# Patient Record
Sex: MALE | Race: WHITE | URBAN - METROPOLITAN AREA
[De-identification: names, ages, dates, MRNs, and addresses within clinical notes are randomized per-mention and may not be internally consistent; named-entity substitution may affect disease eponyms.]

---

## 2018-04-11 ENCOUNTER — APPOINTMENT (OUTPATIENT)
Dept: URBAN - METROPOLITAN AREA CLINIC 292 | Age: 58
Setting detail: DERMATOLOGY
End: 2018-04-11

## 2018-04-11 DIAGNOSIS — L40.0 PSORIASIS VULGARIS: ICD-10-CM

## 2018-04-11 DIAGNOSIS — Z79.899 OTHER LONG TERM (CURRENT) DRUG THERAPY: ICD-10-CM

## 2018-04-11 PROCEDURE — OTHER HIGH RISK MEDICATION MONITORING: OTHER

## 2018-04-11 PROCEDURE — OTHER TREATMENT REGIMEN: OTHER

## 2018-04-11 PROCEDURE — 99203 OFFICE O/P NEW LOW 30 MIN: CPT

## 2018-04-11 PROCEDURE — OTHER PPD: OTHER

## 2018-04-11 PROCEDURE — OTHER COUNSELING: OTHER

## 2018-04-11 PROCEDURE — OTHER ORDER TESTS: OTHER

## 2018-04-11 PROCEDURE — 86580 TB INTRADERMAL TEST: CPT

## 2018-04-11 ASSESSMENT — LOCATION ZONE DERM
LOCATION ZONE: TRUNK
LOCATION ZONE: ARM
LOCATION ZONE: LEG

## 2018-04-11 ASSESSMENT — LOCATION DETAILED DESCRIPTION DERM
LOCATION DETAILED: SUPERIOR LUMBAR SPINE
LOCATION DETAILED: RIGHT ANTERIOR PROXIMAL THIGH
LOCATION DETAILED: LEFT VENTRAL DISTAL FOREARM
LOCATION DETAILED: LEFT ANTERIOR PROXIMAL THIGH
LOCATION DETAILED: LEFT MID-UPPER BACK

## 2018-04-11 ASSESSMENT — LOCATION SIMPLE DESCRIPTION DERM
LOCATION SIMPLE: LEFT THIGH
LOCATION SIMPLE: RIGHT THIGH
LOCATION SIMPLE: LOWER BACK
LOCATION SIMPLE: LEFT FOREARM
LOCATION SIMPLE: LEFT UPPER BACK

## 2018-04-11 ASSESSMENT — BSA PSORIASIS: % BODY COVERED IN PSORIASIS: 60

## 2018-04-11 ASSESSMENT — PGA PSORIASIS: PGA PSORIASIS: MARKED (MARKED PLAQUE ELEVATION, BRIGHT ERYTHEMA, THICK NONTENACIOUS SCALE PREDOMINATES)

## 2018-04-11 NOTE — HPI: RASH
How Severe Is Your Rash?: moderate
Is This A New Presentation, Or A Follow-Up?: Rash
Additional History: Patient states that previously his PCP gave him several topical creams to use with no relief.  Never tried oral medications or injections

## 2018-04-13 ENCOUNTER — APPOINTMENT (OUTPATIENT)
Dept: URBAN - METROPOLITAN AREA CLINIC 292 | Age: 58
Setting detail: DERMATOLOGY
End: 2018-04-13

## 2018-04-13 DIAGNOSIS — L40.0 PSORIASIS VULGARIS: ICD-10-CM

## 2018-04-13 PROCEDURE — OTHER PPD RESULT: OTHER

## 2018-04-13 ASSESSMENT — LOCATION DETAILED DESCRIPTION DERM: LOCATION DETAILED: LEFT VENTRAL PROXIMAL FOREARM

## 2018-04-13 ASSESSMENT — LOCATION ZONE DERM: LOCATION ZONE: ARM

## 2018-04-13 ASSESSMENT — LOCATION SIMPLE DESCRIPTION DERM: LOCATION SIMPLE: LEFT FOREARM

## 2018-05-09 ENCOUNTER — APPOINTMENT (OUTPATIENT)
Dept: URBAN - METROPOLITAN AREA CLINIC 292 | Age: 58
Setting detail: DERMATOLOGY
End: 2018-05-09

## 2018-05-09 DIAGNOSIS — Z79.899 OTHER LONG TERM (CURRENT) DRUG THERAPY: ICD-10-CM

## 2018-05-09 DIAGNOSIS — L40.0 PSORIASIS VULGARIS: ICD-10-CM

## 2018-05-09 PROCEDURE — OTHER HIGH RISK MEDICATION MONITORING: OTHER

## 2018-05-09 PROCEDURE — 99214 OFFICE O/P EST MOD 30 MIN: CPT

## 2018-05-09 PROCEDURE — OTHER TREATMENT REGIMEN: OTHER

## 2018-05-09 PROCEDURE — OTHER COUNSELING: OTHER

## 2018-05-09 ASSESSMENT — LOCATION DETAILED DESCRIPTION DERM
LOCATION DETAILED: LEFT MID-UPPER BACK
LOCATION DETAILED: LEFT ANTERIOR PROXIMAL THIGH
LOCATION DETAILED: RIGHT ANTERIOR PROXIMAL THIGH
LOCATION DETAILED: SUPERIOR LUMBAR SPINE

## 2018-05-09 ASSESSMENT — LOCATION SIMPLE DESCRIPTION DERM
LOCATION SIMPLE: LEFT THIGH
LOCATION SIMPLE: RIGHT THIGH
LOCATION SIMPLE: LOWER BACK
LOCATION SIMPLE: LEFT UPPER BACK

## 2018-05-09 ASSESSMENT — LOCATION ZONE DERM
LOCATION ZONE: TRUNK
LOCATION ZONE: LEG

## 2018-06-20 ENCOUNTER — APPOINTMENT (OUTPATIENT)
Dept: URBAN - METROPOLITAN AREA CLINIC 292 | Age: 58
Setting detail: DERMATOLOGY
End: 2018-06-20

## 2019-04-17 ENCOUNTER — APPOINTMENT (OUTPATIENT)
Dept: URBAN - METROPOLITAN AREA CLINIC 292 | Age: 59
Setting detail: DERMATOLOGY
End: 2019-04-17

## 2019-04-17 DIAGNOSIS — Z79.899 OTHER LONG TERM (CURRENT) DRUG THERAPY: ICD-10-CM

## 2019-04-17 DIAGNOSIS — L40.0 PSORIASIS VULGARIS: ICD-10-CM

## 2019-04-17 PROCEDURE — OTHER ORDER TESTS: OTHER

## 2019-04-17 PROCEDURE — OTHER PRESCRIPTION: OTHER

## 2019-04-17 PROCEDURE — OTHER COUNSELING: OTHER

## 2019-04-17 PROCEDURE — OTHER TREATMENT REGIMEN: OTHER

## 2019-04-17 PROCEDURE — OTHER HIGH RISK MEDICATION MONITORING: OTHER

## 2019-04-17 PROCEDURE — 99214 OFFICE O/P EST MOD 30 MIN: CPT

## 2019-04-17 RX ORDER — BETAMETHASONE DIPROPIONATE 0.5 MG/G
CREAM TOPICAL
Qty: 1 | Refills: 3 | Status: ERX | COMMUNITY
Start: 2019-04-17

## 2019-04-17 ASSESSMENT — LOCATION SIMPLE DESCRIPTION DERM
LOCATION SIMPLE: LOWER BACK
LOCATION SIMPLE: RIGHT THIGH
LOCATION SIMPLE: LEFT UPPER BACK
LOCATION SIMPLE: LEFT THIGH

## 2019-04-17 ASSESSMENT — LOCATION ZONE DERM
LOCATION ZONE: TRUNK
LOCATION ZONE: LEG

## 2019-04-17 ASSESSMENT — LOCATION DETAILED DESCRIPTION DERM
LOCATION DETAILED: LEFT ANTERIOR PROXIMAL THIGH
LOCATION DETAILED: RIGHT ANTERIOR PROXIMAL THIGH
LOCATION DETAILED: SUPERIOR LUMBAR SPINE
LOCATION DETAILED: LEFT MID-UPPER BACK

## 2019-04-17 NOTE — HPI: MEDICATION (COSENTYX)
How Severe Is It?: moderate
Is This A New Presentation, Or A Follow-Up?: Follow Up Cosluis armandox
What Dose Of Cosentyx Are You Taking?: 300mg SC every 4 weeks
How Many Months Of Cosentyx Have You Completed?: 3

## 2023-09-12 ENCOUNTER — APPOINTMENT (OUTPATIENT)
Dept: URBAN - METROPOLITAN AREA CLINIC 233 | Age: 63
Setting detail: DERMATOLOGY
End: 2023-09-12

## 2023-09-12 DIAGNOSIS — L81.4 OTHER MELANIN HYPERPIGMENTATION: ICD-10-CM

## 2023-09-12 DIAGNOSIS — L40.0 PSORIASIS VULGARIS: ICD-10-CM

## 2023-09-12 DIAGNOSIS — D69.2 OTHER NONTHROMBOCYTOPENIC PURPURA: ICD-10-CM

## 2023-09-12 DIAGNOSIS — L82.1 OTHER SEBORRHEIC KERATOSIS: ICD-10-CM

## 2023-09-12 PROCEDURE — OTHER ORDER TESTS: OTHER

## 2023-09-12 PROCEDURE — OTHER SKYRIZI INITIATION: OTHER

## 2023-09-12 PROCEDURE — OTHER PRESCRIPTION: OTHER

## 2023-09-12 PROCEDURE — OTHER MIPS QUALITY: OTHER

## 2023-09-12 PROCEDURE — OTHER COUNSELING: OTHER

## 2023-09-12 PROCEDURE — 99204 OFFICE O/P NEW MOD 45 MIN: CPT

## 2023-09-12 RX ORDER — RISANKIZUMAB-RZAA 150 MG/ML
INJECTION SUBCUTANEOUS
Qty: 1 | Refills: 3 | Status: ERX | COMMUNITY
Start: 2023-09-12

## 2023-09-12 ASSESSMENT — ITCH NUMERIC RATING SCALE: HOW SEVERE IS YOUR ITCHING?: 9

## 2023-09-12 ASSESSMENT — BSA PSORIASIS: % BODY COVERED IN PSORIASIS: 82

## 2023-09-12 ASSESSMENT — PGA PSORIASIS: PGA PSORIASIS 2020: MODERATE

## 2023-09-12 NOTE — HPI: PSORIASIS (PATIENT REPORTED)
Where Is Your Psoriasis Located?: Whole body
Additional History: Pt states they also have large purple bruising on both arms, pcp can not diagnose.

## 2023-09-12 NOTE — PROCEDURE: ORDER TESTS
Billing Type: Third-Party Bill
Expected Date Of Service: 09/12/2023
Bill For Surgical Tray: no
Performing Laboratory: [id number]

## 2023-09-12 NOTE — PROCEDURE: SKYRIZI INITIATION
Add Pregnancy And Lactation Warning To Medication Counseling?: No
Skyrizi Dosing: 150 mg SC week 0 and week 4 then every 12 weeks thereafter
Skyrizi Monitoring Guidelines: A yearly test for tuberculosis is required while taking Skyrizi.
Detail Level: Zone
Include Weight Question: Yes - Pounds
Diagnosis (Required): Psoriasis
Pregnancy And Lactation Warning Text: The risk during pregnancy and breastfeeding is uncertain with this medication.